# Patient Record
Sex: FEMALE | Race: BLACK OR AFRICAN AMERICAN | NOT HISPANIC OR LATINO
[De-identification: names, ages, dates, MRNs, and addresses within clinical notes are randomized per-mention and may not be internally consistent; named-entity substitution may affect disease eponyms.]

---

## 2020-06-15 ENCOUNTER — RESULT REVIEW (OUTPATIENT)
Age: 20
End: 2020-06-15

## 2020-07-28 ENCOUNTER — RESULT REVIEW (OUTPATIENT)
Age: 20
End: 2020-07-28

## 2021-02-03 ENCOUNTER — RESULT REVIEW (OUTPATIENT)
Age: 21
End: 2021-02-03

## 2021-09-20 ENCOUNTER — RESULT REVIEW (OUTPATIENT)
Age: 21
End: 2021-09-20

## 2022-03-18 PROBLEM — Z00.00 ENCOUNTER FOR PREVENTIVE HEALTH EXAMINATION: Status: ACTIVE | Noted: 2022-03-18

## 2023-06-28 ENCOUNTER — OUTPATIENT (OUTPATIENT)
Dept: OUTPATIENT SERVICES | Facility: HOSPITAL | Age: 23
LOS: 1 days | End: 2023-06-28
Payer: COMMERCIAL

## 2023-06-28 VITALS
SYSTOLIC BLOOD PRESSURE: 140 MMHG | DIASTOLIC BLOOD PRESSURE: 80 MMHG | TEMPERATURE: 99 F | HEART RATE: 82 BPM | OXYGEN SATURATION: 99 % | WEIGHT: 173.06 LBS | RESPIRATION RATE: 16 BRPM | HEIGHT: 64 IN

## 2023-06-28 DIAGNOSIS — Z98.890 OTHER SPECIFIED POSTPROCEDURAL STATES: Chronic | ICD-10-CM

## 2023-06-28 DIAGNOSIS — Z01.818 ENCOUNTER FOR OTHER PREPROCEDURAL EXAMINATION: ICD-10-CM

## 2023-06-28 DIAGNOSIS — S43.401A UNSPECIFIED SPRAIN OF RIGHT SHOULDER JOINT, INITIAL ENCOUNTER: ICD-10-CM

## 2023-06-28 DIAGNOSIS — S43.491D OTHER SPRAIN OF RIGHT SHOULDER JOINT, SUBSEQUENT ENCOUNTER: ICD-10-CM

## 2023-06-28 LAB
ANION GAP SERPL CALC-SCNC: 5 MMOL/L — SIGNIFICANT CHANGE UP (ref 5–17)
BUN SERPL-MCNC: 10 MG/DL — SIGNIFICANT CHANGE UP (ref 7–23)
CALCIUM SERPL-MCNC: 8.5 MG/DL — SIGNIFICANT CHANGE UP (ref 8.5–10.1)
CHLORIDE SERPL-SCNC: 109 MMOL/L — HIGH (ref 96–108)
CO2 SERPL-SCNC: 25 MMOL/L — SIGNIFICANT CHANGE UP (ref 22–31)
CREAT SERPL-MCNC: 0.7 MG/DL — SIGNIFICANT CHANGE UP (ref 0.5–1.3)
EGFR: 123 ML/MIN/1.73M2 — SIGNIFICANT CHANGE UP
GLUCOSE SERPL-MCNC: 86 MG/DL — SIGNIFICANT CHANGE UP (ref 70–99)
HCG SERPL-ACNC: <1 MIU/ML — SIGNIFICANT CHANGE UP
HCT VFR BLD CALC: 37.6 % — SIGNIFICANT CHANGE UP (ref 34.5–45)
HGB BLD-MCNC: 11.9 G/DL — SIGNIFICANT CHANGE UP (ref 11.5–15.5)
MCHC RBC-ENTMCNC: 27.3 PG — SIGNIFICANT CHANGE UP (ref 27–34)
MCHC RBC-ENTMCNC: 31.6 GM/DL — LOW (ref 32–36)
MCV RBC AUTO: 86.2 FL — SIGNIFICANT CHANGE UP (ref 80–100)
NRBC # BLD: 0 /100 WBCS — SIGNIFICANT CHANGE UP (ref 0–0)
PLATELET # BLD AUTO: 204 K/UL — SIGNIFICANT CHANGE UP (ref 150–400)
POTASSIUM SERPL-MCNC: 3.9 MMOL/L — SIGNIFICANT CHANGE UP (ref 3.5–5.3)
POTASSIUM SERPL-SCNC: 3.9 MMOL/L — SIGNIFICANT CHANGE UP (ref 3.5–5.3)
RBC # BLD: 4.36 M/UL — SIGNIFICANT CHANGE UP (ref 3.8–5.2)
RBC # FLD: 14.8 % — HIGH (ref 10.3–14.5)
SODIUM SERPL-SCNC: 139 MMOL/L — SIGNIFICANT CHANGE UP (ref 135–145)
WBC # BLD: 5.72 K/UL — SIGNIFICANT CHANGE UP (ref 3.8–10.5)
WBC # FLD AUTO: 5.72 K/UL — SIGNIFICANT CHANGE UP (ref 3.8–10.5)

## 2023-06-28 PROCEDURE — 84702 CHORIONIC GONADOTROPIN TEST: CPT

## 2023-06-28 PROCEDURE — 80048 BASIC METABOLIC PNL TOTAL CA: CPT

## 2023-06-28 PROCEDURE — 85027 COMPLETE CBC AUTOMATED: CPT

## 2023-06-28 PROCEDURE — 36415 COLL VENOUS BLD VENIPUNCTURE: CPT

## 2023-06-28 PROCEDURE — G0463: CPT

## 2023-06-28 NOTE — H&P PST ADULT - PAIN SCORE
Airway patent, Nasal mucosa clear. Mouth with normal mucosa. Throat has no vesicles, no oropharyngeal exudates and uvula is midline. 3

## 2023-06-28 NOTE — H&P PST ADULT - MUSCULOSKELETAL
Right shoulder/decreased ROM due to pain/strength 5/5 bilateral lower extremities/decreased strength details…

## 2023-06-28 NOTE — H&P PST ADULT - NSICDXFAMILYHX_GEN_ALL_CORE_FT
FAMILY HISTORY:  Father  Still living? Yes, Estimated age: 61-70  FH: HTN (hypertension), Age at diagnosis: Age Unknown    Mother  Still living? Yes, Estimated age: 51-60  FH: sarcoidosis, Age at diagnosis: Age Unknown    Aunt  Still living? Yes, Estimated age: 61-70  FHx: type 2 diabetes mellitus, Age at diagnosis: Age Unknown
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
## 2023-06-28 NOTE — H&P PST ADULT - ATTENDING COMMENTS
Agree with above. Patient medically optimized for procedure. Proceed with right shoulder arthroscopic labrum repair.

## 2023-06-28 NOTE — H&P PST ADULT - PATIENT'S PREFERRED PRONOUN
REF#[id number]-IC. J. FLOOD-3 VISITS     Referral completed in [facility], expires 12/28/2020, [place]/spec office informed-lb  
Her/She

## 2023-06-29 RX ORDER — MORPHINE SULFATE 50 MG/1
10 CAPSULE, EXTENDED RELEASE ORAL ONCE
Refills: 0 | Status: DISCONTINUED | OUTPATIENT
Start: 2023-07-03 | End: 2023-07-03

## 2023-06-29 RX ORDER — CEFAZOLIN SODIUM 1 G
2000 VIAL (EA) INJECTION ONCE
Refills: 0 | Status: COMPLETED | OUTPATIENT
Start: 2023-07-03 | End: 2023-07-03

## 2023-06-30 RX ORDER — SODIUM CHLORIDE 9 MG/ML
1000 INJECTION, SOLUTION INTRAVENOUS
Refills: 0 | Status: DISCONTINUED | OUTPATIENT
Start: 2023-07-03 | End: 2023-07-03

## 2023-06-30 NOTE — ASU PATIENT PROFILE, ADULT - FALL HARM RISK - UNIVERSAL INTERVENTIONS
Bed in lowest position, wheels locked, appropriate side rails in place/Call bell, personal items and telephone in reach/Instruct patient to call for assistance before getting out of bed or chair/Non-slip footwear when patient is out of bed/[place] to call system/Physically safe environment - no spills, clutter or unnecessary equipment/Purposeful Proactive Rounding/Room/bathroom lighting operational, light cord in reach

## 2023-07-02 ENCOUNTER — TRANSCRIPTION ENCOUNTER (OUTPATIENT)
Age: 23
End: 2023-07-02

## 2023-07-03 ENCOUNTER — TRANSCRIPTION ENCOUNTER (OUTPATIENT)
Age: 23
End: 2023-07-03

## 2023-07-03 ENCOUNTER — OUTPATIENT (OUTPATIENT)
Dept: OUTPATIENT SERVICES | Facility: HOSPITAL | Age: 23
LOS: 1 days | Discharge: HOME | End: 2023-07-03
Payer: COMMERCIAL

## 2023-07-03 VITALS
DIASTOLIC BLOOD PRESSURE: 79 MMHG | SYSTOLIC BLOOD PRESSURE: 121 MMHG | TEMPERATURE: 98 F | RESPIRATION RATE: 16 BRPM | OXYGEN SATURATION: 100 % | HEIGHT: 64 IN | HEART RATE: 71 BPM | WEIGHT: 175.05 LBS

## 2023-07-03 VITALS
OXYGEN SATURATION: 98 % | HEART RATE: 72 BPM | DIASTOLIC BLOOD PRESSURE: 78 MMHG | RESPIRATION RATE: 14 BRPM | SYSTOLIC BLOOD PRESSURE: 126 MMHG

## 2023-07-03 DIAGNOSIS — Z98.890 OTHER SPECIFIED POSTPROCEDURAL STATES: Chronic | ICD-10-CM

## 2023-07-03 DIAGNOSIS — Z01.818 ENCOUNTER FOR OTHER PREPROCEDURAL EXAMINATION: ICD-10-CM

## 2023-07-03 DIAGNOSIS — S43.491D OTHER SPRAIN OF RIGHT SHOULDER JOINT, SUBSEQUENT ENCOUNTER: ICD-10-CM

## 2023-07-03 LAB — HCG UR QL: NEGATIVE — SIGNIFICANT CHANGE UP

## 2023-07-03 PROCEDURE — 29807 SHO ARTHRS SRG RPR SLAP LES: CPT | Mod: RT

## 2023-07-03 PROCEDURE — C1713: CPT

## 2023-07-03 PROCEDURE — 81025 URINE PREGNANCY TEST: CPT

## 2023-07-03 DEVICE — SUT ANCHOR FIBERTAK SELF BUNCH 1.8 KL: Type: IMPLANTABLE DEVICE | Site: RIGHT | Status: FUNCTIONAL

## 2023-07-03 RX ORDER — ONDANSETRON 8 MG/1
4 TABLET, FILM COATED ORAL ONCE
Refills: 0 | Status: DISCONTINUED | OUTPATIENT
Start: 2023-07-03 | End: 2023-07-03

## 2023-07-03 RX ORDER — OXYCODONE HYDROCHLORIDE 5 MG/1
1 TABLET ORAL
Qty: 28 | Refills: 0
Start: 2023-07-03 | End: 2023-07-09

## 2023-07-03 RX ORDER — OXYCODONE HYDROCHLORIDE 5 MG/1
5 TABLET ORAL ONCE
Refills: 0 | Status: DISCONTINUED | OUTPATIENT
Start: 2023-07-03 | End: 2023-07-03

## 2023-07-03 RX ORDER — HYDROMORPHONE HYDROCHLORIDE 2 MG/ML
0.5 INJECTION INTRAMUSCULAR; INTRAVENOUS; SUBCUTANEOUS
Refills: 0 | Status: DISCONTINUED | OUTPATIENT
Start: 2023-07-03 | End: 2023-07-03

## 2023-07-03 RX ORDER — ONDANSETRON 8 MG/1
1 TABLET, FILM COATED ORAL
Qty: 28 | Refills: 0
Start: 2023-07-03 | End: 2023-07-09

## 2023-07-03 RX ORDER — SODIUM CHLORIDE 9 MG/ML
1000 INJECTION, SOLUTION INTRAVENOUS
Refills: 0 | Status: DISCONTINUED | OUTPATIENT
Start: 2023-07-03 | End: 2023-07-03

## 2023-07-03 RX ORDER — DOCUSATE SODIUM 100 MG
2 CAPSULE ORAL
Qty: 42 | Refills: 0
Start: 2023-07-03 | End: 2023-07-09

## 2023-07-03 RX ADMIN — HYDROMORPHONE HYDROCHLORIDE 0.5 MILLIGRAM(S): 2 INJECTION INTRAMUSCULAR; INTRAVENOUS; SUBCUTANEOUS at 14:32

## 2023-07-03 RX ADMIN — SODIUM CHLORIDE 75 MILLILITER(S): 9 INJECTION, SOLUTION INTRAVENOUS at 10:07

## 2023-07-03 RX ADMIN — SODIUM CHLORIDE 75 MILLILITER(S): 9 INJECTION, SOLUTION INTRAVENOUS at 14:32

## 2023-07-03 RX ADMIN — HYDROMORPHONE HYDROCHLORIDE 0.5 MILLIGRAM(S): 2 INJECTION INTRAMUSCULAR; INTRAVENOUS; SUBCUTANEOUS at 14:55

## 2023-07-03 NOTE — ASU DISCHARGE PLAN (ADULT/PEDIATRIC) - NS MD DC FALL RISK RISK
For information on Fall & Injury Prevention, visit: [email or web address] OR  [email or web address] [place]  https://www.cdc.gov/steadi/patient.html

## 2023-07-03 NOTE — ASU DISCHARGE PLAN (ADULT/PEDIATRIC) - CARE PROVIDER_API CALL
Jeffry Dunn  Orthopaedic Surgery  [address] [place] [address]  Phone: [phone]  Fax: [phone]  Follow Up Time:

## 2023-07-03 NOTE — ASU DISCHARGE PLAN (ADULT/PEDIATRIC) - ASU DC SPECIAL INSTRUCTIONSFT
Discharge Instructions for Arthroscopic Labrum Repair:    1. ACTIVITY: No weight bearing or range of motion of R shoulder. Sling on at all times.  2. CALL FOR: fever over 101, wound redness, drainage or open area, calf pain/calf swelling.  3. BANDAGE: Change dressing to a new bandage POD7. May change sooner if dressing saturated or falling off. DO NOT REMOVE BANDAGE TO CHECK WOUND ON INTAKE.  4. SHOWER: Okay to shower. Do not scrub dressing or submerge under water. No baths or hot tubs.   5.  FOLLOW UP: Dr. Dunn in 1 week. Call to schedule.  6. MEDICATION: eRX sent to your pharmacy for  if you go home.   7.**Call office if medications not covered under your insurance, especially BLOOD CLOT PREVENTION/anticoagulant medication.

## 2023-07-20 RX ORDER — VALACYCLOVIR 500 MG/1
1 TABLET, FILM COATED ORAL
Refills: 0 | DISCHARGE

## 2025-02-28 ENCOUNTER — EMERGENCY (EMERGENCY)
Facility: HOSPITAL | Age: 25
LOS: 1 days | Discharge: HOME | End: 2025-02-28
Attending: STUDENT IN AN ORGANIZED HEALTH CARE EDUCATION/TRAINING PROGRAM | Admitting: STUDENT IN AN ORGANIZED HEALTH CARE EDUCATION/TRAINING PROGRAM
Payer: MEDICAID

## 2025-02-28 VITALS
OXYGEN SATURATION: 100 % | DIASTOLIC BLOOD PRESSURE: 76 MMHG | TEMPERATURE: 98 F | HEART RATE: 67 BPM | WEIGHT: 187.39 LBS | RESPIRATION RATE: 20 BRPM | HEIGHT: 64 IN | SYSTOLIC BLOOD PRESSURE: 115 MMHG

## 2025-02-28 VITALS
TEMPERATURE: 98 F | RESPIRATION RATE: 19 BRPM | DIASTOLIC BLOOD PRESSURE: 63 MMHG | OXYGEN SATURATION: 99 % | SYSTOLIC BLOOD PRESSURE: 106 MMHG | HEART RATE: 65 BPM

## 2025-02-28 LAB
ALBUMIN SERPL ELPH-MCNC: 3.5 G/DL — SIGNIFICANT CHANGE UP (ref 3.3–5)
ALP SERPL-CCNC: 65 U/L — SIGNIFICANT CHANGE UP (ref 30–120)
ALT FLD-CCNC: 21 U/L — SIGNIFICANT CHANGE UP (ref 10–60)
ANION GAP SERPL CALC-SCNC: 10 MMOL/L — SIGNIFICANT CHANGE UP (ref 5–17)
APTT BLD: 26.3 SEC — SIGNIFICANT CHANGE UP (ref 24.5–35.6)
AST SERPL-CCNC: 13 U/L — SIGNIFICANT CHANGE UP (ref 10–40)
BASOPHILS # BLD AUTO: 0.04 K/UL — SIGNIFICANT CHANGE UP (ref 0–0.2)
BASOPHILS NFR BLD AUTO: 0.4 % — SIGNIFICANT CHANGE UP (ref 0–2)
BILIRUB SERPL-MCNC: 0.3 MG/DL — SIGNIFICANT CHANGE UP (ref 0.2–1.2)
BLD GP AB SCN SERPL QL: SIGNIFICANT CHANGE UP
BUN SERPL-MCNC: 6 MG/DL — LOW (ref 7–23)
CALCIUM SERPL-MCNC: 8.7 MG/DL — SIGNIFICANT CHANGE UP (ref 8.4–10.5)
CHLORIDE SERPL-SCNC: 100 MMOL/L — SIGNIFICANT CHANGE UP (ref 96–108)
CO2 SERPL-SCNC: 25 MMOL/L — SIGNIFICANT CHANGE UP (ref 22–31)
CREAT SERPL-MCNC: 0.83 MG/DL — SIGNIFICANT CHANGE UP (ref 0.5–1.3)
EGFR: 99 ML/MIN/1.73M2 — SIGNIFICANT CHANGE UP
EOSINOPHIL # BLD AUTO: 0.12 K/UL — SIGNIFICANT CHANGE UP (ref 0–0.5)
EOSINOPHIL NFR BLD AUTO: 1.2 % — SIGNIFICANT CHANGE UP (ref 0–6)
GLUCOSE SERPL-MCNC: 92 MG/DL — SIGNIFICANT CHANGE UP (ref 70–99)
HCG SERPL-ACNC: 7417 MIU/ML — HIGH
HCT VFR BLD CALC: 36.2 % — SIGNIFICANT CHANGE UP (ref 34.5–45)
HGB BLD-MCNC: 11.8 G/DL — SIGNIFICANT CHANGE UP (ref 11.5–15.5)
IMM GRANULOCYTES NFR BLD AUTO: 0.2 % — SIGNIFICANT CHANGE UP (ref 0–0.9)
INR BLD: 1.04 RATIO — SIGNIFICANT CHANGE UP (ref 0.85–1.16)
LIDOCAIN IGE QN: 17 U/L — SIGNIFICANT CHANGE UP (ref 16–77)
LYMPHOCYTES # BLD AUTO: 1.32 K/UL — SIGNIFICANT CHANGE UP (ref 1–3.3)
LYMPHOCYTES # BLD AUTO: 13.1 % — SIGNIFICANT CHANGE UP (ref 13–44)
MCHC RBC-ENTMCNC: 27.3 PG — SIGNIFICANT CHANGE UP (ref 27–34)
MCHC RBC-ENTMCNC: 32.6 G/DL — SIGNIFICANT CHANGE UP (ref 32–36)
MCV RBC AUTO: 83.6 FL — SIGNIFICANT CHANGE UP (ref 80–100)
MONOCYTES # BLD AUTO: 0.48 K/UL — SIGNIFICANT CHANGE UP (ref 0–0.9)
MONOCYTES NFR BLD AUTO: 4.7 % — SIGNIFICANT CHANGE UP (ref 2–14)
NEUTROPHILS # BLD AUTO: 8.13 K/UL — HIGH (ref 1.8–7.4)
NEUTROPHILS NFR BLD AUTO: 80.4 % — HIGH (ref 43–77)
NRBC BLD AUTO-RTO: 0 /100 WBCS — SIGNIFICANT CHANGE UP (ref 0–0)
PLATELET # BLD AUTO: 226 K/UL — SIGNIFICANT CHANGE UP (ref 150–400)
POTASSIUM SERPL-MCNC: 3.5 MMOL/L — SIGNIFICANT CHANGE UP (ref 3.5–5.3)
POTASSIUM SERPL-SCNC: 3.5 MMOL/L — SIGNIFICANT CHANGE UP (ref 3.5–5.3)
PROT SERPL-MCNC: 7.1 G/DL — SIGNIFICANT CHANGE UP (ref 6–8.3)
PROTHROM AB SERPL-ACNC: 12.1 SEC — SIGNIFICANT CHANGE UP (ref 9.9–13.4)
RBC # BLD: 4.33 M/UL — SIGNIFICANT CHANGE UP (ref 3.8–5.2)
RBC # FLD: 14.2 % — SIGNIFICANT CHANGE UP (ref 10.3–14.5)
SODIUM SERPL-SCNC: 135 MMOL/L — SIGNIFICANT CHANGE UP (ref 135–145)
WBC # BLD: 10.11 K/UL — SIGNIFICANT CHANGE UP (ref 3.8–10.5)
WBC # FLD AUTO: 10.11 K/UL — SIGNIFICANT CHANGE UP (ref 3.8–10.5)

## 2025-02-28 PROCEDURE — 76856 US EXAM PELVIC COMPLETE: CPT

## 2025-02-28 PROCEDURE — 96361 HYDRATE IV INFUSION ADD-ON: CPT

## 2025-02-28 PROCEDURE — 96374 THER/PROPH/DIAG INJ IV PUSH: CPT

## 2025-02-28 PROCEDURE — 76856 US EXAM PELVIC COMPLETE: CPT | Mod: 26

## 2025-02-28 PROCEDURE — 84702 CHORIONIC GONADOTROPIN TEST: CPT

## 2025-02-28 PROCEDURE — 83690 ASSAY OF LIPASE: CPT

## 2025-02-28 PROCEDURE — 99284 EMERGENCY DEPT VISIT MOD MDM: CPT

## 2025-02-28 PROCEDURE — 86901 BLOOD TYPING SEROLOGIC RH(D): CPT

## 2025-02-28 PROCEDURE — 85610 PROTHROMBIN TIME: CPT

## 2025-02-28 PROCEDURE — 36415 COLL VENOUS BLD VENIPUNCTURE: CPT

## 2025-02-28 PROCEDURE — 99284 EMERGENCY DEPT VISIT MOD MDM: CPT | Mod: 25

## 2025-02-28 PROCEDURE — 86900 BLOOD TYPING SEROLOGIC ABO: CPT

## 2025-02-28 PROCEDURE — 85730 THROMBOPLASTIN TIME PARTIAL: CPT

## 2025-02-28 PROCEDURE — 85025 COMPLETE CBC W/AUTO DIFF WBC: CPT

## 2025-02-28 PROCEDURE — 80053 COMPREHEN METABOLIC PANEL: CPT

## 2025-02-28 PROCEDURE — 86850 RBC ANTIBODY SCREEN: CPT

## 2025-02-28 RX ORDER — ACETAMINOPHEN 500 MG/5ML
1000 LIQUID (ML) ORAL ONCE
Refills: 0 | Status: COMPLETED | OUTPATIENT
Start: 2025-02-28 | End: 2025-02-28

## 2025-02-28 RX ADMIN — Medication 1000 MILLILITER(S): at 19:35

## 2025-02-28 RX ADMIN — Medication 1000 MILLILITER(S): at 20:35

## 2025-02-28 RX ADMIN — Medication 1000 MILLIGRAM(S): at 22:00

## 2025-02-28 RX ADMIN — Medication 400 MILLIGRAM(S): at 21:18

## 2025-02-28 RX ADMIN — Medication 1000 MILLIGRAM(S): at 21:33

## 2025-02-28 NOTE — ED ADULT NURSE NOTE - OBJECTIVE STATEMENT
" I took a miscarriage medication today , I started having vaginal bleeding at 2pm . I have abdominal pain "  LMP 1/4/2025  ( First pregnancy ) with [facility]

## 2025-02-28 NOTE — ED ADULT TRIAGE NOTE - CHIEF COMPLAINT QUOTE
" I took a miscarriage medication today , I started having vaginal bleeding at 2pm . I have abdominal pain "  LMP 1/4/2025  ( First pregnancy )

## 2025-03-01 NOTE — ED PROVIDER NOTE - OBJECTIVE STATEMENT
25-year-old female with no significant PMH presents with vaginal bleeding and abdominal pain.  Patient is  with LMP 2025.  Patient saw her OB/GYN today and was given misoprostol to induce an .  Patient states she was told that she would develop abdominal pain, bleeding, and cramping.  She was advised to take Tylenol.  Patient states she inserted the misoprostol vaginally and did not take any analgesia.  A few hours later she developed severe abdominal pain and bleeding.  She states the bleeding is not heavy, she has not saturated a pad yet.  She is passing small clots.  However when she was attempting to have a bowel movement, she started to feel lightheaded and weak.  She took 2 tabs of Tylenol prior to arrival and states her abdominal pain has now improved.  She denies syncope, fever, vomiting. Ob/Gyn Swati BORJA)

## 2025-03-01 NOTE — ED PROVIDER NOTE - NSFOLLOWUPINSTRUCTIONS_ED_ALL_ED_FT
Please follow up with your Ob/Gyn.  Rest, hydrate, and take Tylenol for pain.  Return to the ER for persistent or worsening pain, fever, heavy bleeding, back pain, burning urination, dizziness, or any other concerns.     Incomplete Miscarriage    A miscarriage is the loss of an unborn baby (fetus) before the 20th week of pregnancy. In an incomplete miscarriage, parts of the fetus or placenta (afterbirth) remain in the body. Most miscarriages happen in the first 3 months of pregnancy. Sometimes, it happens before a woman even knows she is pregnant.    Having a miscarriage can be an emotional experience. If you have had a miscarriage, talk with your health care provider about any questions you may have about miscarrying, the grieving process, and your future pregnancy plans.    What are the causes?  This condition may be caused by:    Problems with the genes or chromosomes that make it impossible for the baby to develop normally. These problems are most often the result of random errors that occur early in development, and are not passed from parent to child (not inherited).  Infection of the cervix or uterus.  Conditions that affect hormone balance in the body.  Problems with the cervix, such as the cervix opening and thinning before pregnancy is at term (cervical insufficiency).  Problems with the uterus, such as a uterus with an abnormal shape, fibroids in the uterus, or problems that were present from birth (congenital abnormalities).  Certain medical conditions.  Smoking, drinking alcohol, or using drugs.  Injury (trauma).    Many times, the cause of a miscarriage is not known.    What are the signs or symptoms?  Symptoms of this condition include:    Vaginal bleeding or spotting, with or without cramps or pain.  Pain or cramping in the abdomen or lower back.  Passing fluid, tissue, or blood clots from the vagina.    How is this diagnosed?  This condition may be diagnosed based on:    A physical exam.  Ultrasound.  Blood tests.  Urine tests.    How is this treated?  An incomplete miscarriage may be treated with:    Dilation and curettage (D&C). This is a procedure in which the cervix is stretched open and the lining of the uterus (endometrium) is scraped to remove any remaining tissue from the pregnancy.  Medicines, such as:    Antibiotic medicine to treat infection.  Medicine to help any remaining tissue pass out of your uterus.  Medicine to reduce (contract) the size of the uterus. These medicines may be given if you have a lot of bleeding.    If you have Rh negative blood and your baby was Rh positive, you will need a shot of medicine called Rh immunoglobulinto protect future babies from Rh blood problems. "Rh-negative" and "Rh-positive" refer to whether or not the blood has a specific protein found on the surface of red blood cells (Rh factor).    Follow these instructions at home:      Medicines     Take over-the-counter and prescription medicines only as told by your health care provider.  If you were prescribed antibiotic medicine, take your antibiotic as told by your health care provider. Do not stop taking the antibiotic even if you start to feel better.  Do not take NSAIDs, such as aspirin and ibuprofen, unless approved by your doctor. These medicines can cause bleeding.        Activity    Rest as directed. Ask your health care provider what activities are safe for you.  Have someone help with home and family responsibilities during this time.        General instructions    Keep track of the number of sanitary pads you use each day and how soaked (saturated) they are. Write down this information.  Monitor the amount of tissue or blood clots that you pass from your vagina. Save any large amounts of tissue for your health care provider to examine.  Do not use tampons, douche, or have sex until your health care provider approves.  To help you and your partner with the process of grieving, talk with your health care provider or seek counseling to help cope with the pregnancy loss.  When you are ready, meet with your health care provider to discuss important steps you should take for your health, as well as steps to take in order to have a healthy pregnancy in the future.  Keep all follow-up visits as told by your health care provider. This is important.    Where to find more information  The American Congress of Obstetricians and Gynecologists: www.acog.org  U.[place] Department of Health and Human Services Office of Women’s Health: www.womenshealth.gov    Contact a health care provider if:  You have a fever or chills.  You have a foul smelling vaginal discharge.    Get help right away if:  You have severe cramps or pain in your back or abdomen.  You pass walnut-sized (or larger) blood clots or tissue from your vagina.  You have heavy bleeding, soaking more than 1 regular sanitary pad in an hour.  You become lightheaded or weak.  You pass out.  You have feelings of sadness that take over your thoughts, or you have thoughts of hurting yourself.    Summary  In an incomplete miscarriage, parts of the fetus or placenta (afterbirth) remain in the body.  There are multiple treatment options for an incomplete miscarriage, talk to your health care provider about the best option for you.  Follow your health care provider's instructions for follow-up care.  To help you and your partner with the process of grieving, talk with your health care provider or seek counseling to help cope with the pregnancy loss.    ADDITIONAL NOTES AND INSTRUCTIONS    Please follow up with your Primary MD in 24-48 hr.  Seek immediate medical care for any new/worsening signs or symptoms.

## 2025-03-01 NOTE — ED PROVIDER NOTE - DIFFERENTIAL DIAGNOSIS
Differential Diagnosis Ddx includes but not limited to threatened AB, missed AB, incomplete AB, retained POC, anemia, dehydratio

## 2025-03-01 NOTE — ED PROVIDER NOTE - PROVIDER TOKENS
FREE:[LAST:[Oxana],FIRST:[Swati],PHONE:[(   )    -],FAX:[(   )    -]],PROVIDER:[TOKEN:[[id number]]]

## 2025-03-01 NOTE — ED PROVIDER NOTE - CLINICAL SUMMARY MEDICAL DECISION MAKING FREE TEXT BOX
25-year-old female  presents with abdominal pain, cramping, vaginal bleeding that started after taking misoprostol today to induce an .  Patient states that her vaginal bleeding is not heavy and her pain has improved after taking Tylenol however she reported feeling lightheaded and weak after having a bowel movement.  Check labs, H&H, hCG, UA, hydrate, pelvic ultrasound, consult OB/GYN as needed.

## 2025-03-01 NOTE — ED PROVIDER NOTE - CARE PROVIDERS DIRECT ADDRESSES
,DirectAddress_Unknown,[email or web address]

## 2025-03-01 NOTE — ED PROVIDER NOTE - PROGRESS NOTE DETAILS
Results of workup discussed with patient, copies of all reports given.  Patient currently states she feels well.  Abdomen soft, nontender, nondistended.  Patient advised to follow-up closely with OB/GYN, take Tylenol for pain.  Return precautions discussed.  Friend at bedside to take patient home.  Patient expressed understanding of discharge instructions.

## 2025-03-01 NOTE — ED PROVIDER NOTE - PATIENT PORTAL LINK FT
You can access the FollowMyHealth Patient Portal offered by [facility] by registering at the following website: [email or web address]. By joining [facility]’s FollowMyHealth portal, you will also be able to view your health information using other applications (apps) compatible with our system.

## 2025-03-01 NOTE — ED PROVIDER NOTE - CARE PROVIDER_API CALL
Swati Daigle  Phone: (   )    -  Fax: (   )    -  Follow Up Time:     Yandel Fernández  Obstetrics and Gynecology  [address] [place] [address], [address] [place] [id number]  Phone: [phone]  Fax: [phone]  Follow Up Time:

## (undated) DEVICE — S&N ARTHROCARE WAND COBLATION WEREWOLF FLOW 90

## (undated) DEVICE — DRAPE U POLY BLUE 60"X60"

## (undated) DEVICE — SUT NDL MAYO CATGUT 1/2 CIRCLE TAPER POINT 0.050" X 1.092"

## (undated) DEVICE — WARMING BLANKET LOWER ADULT

## (undated) DEVICE — FLOORMAT SURGISAFE ABSORBANT WHITE 36" X 72"

## (undated) DEVICE — PLV-LINVATEC ARTHROSCOPIC TRAY: Type: DURABLE MEDICAL EQUIPMENT

## (undated) DEVICE — DRSG STOCKINETTE IMPERVIOUS LG

## (undated) DEVICE — PACK SHOULDER ARTHROSCOPY

## (undated) DEVICE — CANNULA ARTHREX TWIST IN NO SQUIRT CAP 7X7 PURPLE

## (undated) DEVICE — CANNULA LINVATEC SHOULDER 7CM (GREY & ORANGE)

## (undated) DEVICE — POSITIONER S&N FACE MASK SPIDER 2

## (undated) DEVICE — TUBING DEPUY MITEK FMS OUTFLOW

## (undated) DEVICE — VENODYNE/SCD SLEEVE CALF LARGE

## (undated) DEVICE — SUT MONOCRYL 3-0 18" PS-2 UNDYED

## (undated) DEVICE — LAP PAD W RING 18 X 18"

## (undated) DEVICE — ARTHREX KIT CURVED SPEAR FOR KNOTLESS FIBERTAK ANCHOR

## (undated) DEVICE — SHAVER BLADE LINVATEC FULL RADIUS RESECTOR 3.5MM

## (undated) DEVICE — S&N ARTHROCARE WAND TURBOVAC 90 DEGREE

## (undated) DEVICE — Device

## (undated) DEVICE — SOL IRR BAG NS 0.9% 3000ML

## (undated) DEVICE — SYR LUER LOK 50CC

## (undated) DEVICE — SUT POLYSORB 2-0 30" C-14 UNDYED

## (undated) DEVICE — NDL SPINAL 18G X 3.5" (PINK)

## (undated) DEVICE — SOL IRR POUR H2O 1000ML

## (undated) DEVICE — BUR LINVATEC OVAL 4MM

## (undated) DEVICE — GLV 7.5 PROTEXIS (BLUE)

## (undated) DEVICE — DRSG TEGADERM 4X4.75"

## (undated) DEVICE — GLV 7 PROTEXIS (WHITE)

## (undated) DEVICE — DRAPE TOWEL BLUE 17" X 24"

## (undated) DEVICE — SUT LASSO 90 DEGREE CURVE STRAIGHT

## (undated) DEVICE — DRAPE 3/4 SHEET W REINFORCEMENT 56X77"

## (undated) DEVICE — CANNULA ARTHREX CRYSTAL SMOOTH 5.75MMX7MM ORANGE

## (undated) DEVICE — TUBING DEPUY MITEK FMS INFLOW

## (undated) DEVICE — SYR LUER LOK 10CC

## (undated) DEVICE — SUT FIBERWIRE #2 38" STRAND 1 BLUE 1 WHITE/BLACK

## (undated) DEVICE — SHAVER BLADE GREAT WHITE 4.2MM

## (undated) DEVICE — PLV-SCD MACHINE: Type: DURABLE MEDICAL EQUIPMENT

## (undated) DEVICE — NDL HYPO SAFE 22G X 1.5" (BLACK)

## (undated) DEVICE — VENODYNE/SCD SLEEVE CALF MEDIUM

## (undated) DEVICE — BUR LINVATEC VORTEX 4.5 GREEN